# Patient Record
Sex: MALE | Race: OTHER | NOT HISPANIC OR LATINO | URBAN - METROPOLITAN AREA
[De-identification: names, ages, dates, MRNs, and addresses within clinical notes are randomized per-mention and may not be internally consistent; named-entity substitution may affect disease eponyms.]

---

## 2019-09-25 ENCOUNTER — INPATIENT (INPATIENT)
Facility: HOSPITAL | Age: 59
LOS: 1 days | Discharge: HOME | DRG: 812 | End: 2019-09-27
Attending: STUDENT IN AN ORGANIZED HEALTH CARE EDUCATION/TRAINING PROGRAM | Admitting: STUDENT IN AN ORGANIZED HEALTH CARE EDUCATION/TRAINING PROGRAM
Payer: MEDICAID

## 2019-09-25 VITALS
OXYGEN SATURATION: 100 % | TEMPERATURE: 99 F | SYSTOLIC BLOOD PRESSURE: 156 MMHG | HEART RATE: 68 BPM | DIASTOLIC BLOOD PRESSURE: 93 MMHG | RESPIRATION RATE: 19 BRPM | WEIGHT: 203.05 LBS | HEIGHT: 70 IN

## 2019-09-25 DIAGNOSIS — D64.9 ANEMIA, UNSPECIFIED: ICD-10-CM

## 2019-09-25 LAB
ALBUMIN SERPL ELPH-MCNC: 4.2 G/DL — SIGNIFICANT CHANGE UP (ref 3.5–5)
ALP SERPL-CCNC: 71 U/L — SIGNIFICANT CHANGE UP (ref 40–120)
ALT FLD-CCNC: 26 U/L DA — SIGNIFICANT CHANGE UP (ref 10–60)
ANION GAP SERPL CALC-SCNC: 6 MMOL/L — SIGNIFICANT CHANGE UP (ref 5–17)
APTT BLD: 30 SEC — SIGNIFICANT CHANGE UP (ref 27.5–36.3)
AST SERPL-CCNC: 14 U/L — SIGNIFICANT CHANGE UP (ref 10–40)
BASOPHILS # BLD AUTO: 0.04 K/UL — SIGNIFICANT CHANGE UP (ref 0–0.2)
BASOPHILS NFR BLD AUTO: 0.6 % — SIGNIFICANT CHANGE UP (ref 0–2)
BILIRUB SERPL-MCNC: 0.4 MG/DL — SIGNIFICANT CHANGE UP (ref 0.2–1.2)
BUN SERPL-MCNC: 18 MG/DL — SIGNIFICANT CHANGE UP (ref 7–18)
CALCIUM SERPL-MCNC: 9.3 MG/DL — SIGNIFICANT CHANGE UP (ref 8.4–10.5)
CHLORIDE SERPL-SCNC: 106 MMOL/L — SIGNIFICANT CHANGE UP (ref 96–108)
CO2 SERPL-SCNC: 28 MMOL/L — SIGNIFICANT CHANGE UP (ref 22–31)
CREAT SERPL-MCNC: 1.21 MG/DL — SIGNIFICANT CHANGE UP (ref 0.5–1.3)
EOSINOPHIL # BLD AUTO: 0.23 K/UL — SIGNIFICANT CHANGE UP (ref 0–0.5)
EOSINOPHIL NFR BLD AUTO: 3.7 % — SIGNIFICANT CHANGE UP (ref 0–6)
GLUCOSE SERPL-MCNC: 100 MG/DL — HIGH (ref 70–99)
HCT VFR BLD CALC: 32.5 % — LOW (ref 39–50)
HGB BLD-MCNC: 8.9 G/DL — LOW (ref 13–17)
IMM GRANULOCYTES NFR BLD AUTO: 0.3 % — SIGNIFICANT CHANGE UP (ref 0–1.5)
INR BLD: 1.15 RATIO — SIGNIFICANT CHANGE UP (ref 0.88–1.16)
LYMPHOCYTES # BLD AUTO: 2.65 K/UL — SIGNIFICANT CHANGE UP (ref 1–3.3)
LYMPHOCYTES # BLD AUTO: 42.1 % — SIGNIFICANT CHANGE UP (ref 13–44)
MCHC RBC-ENTMCNC: 17.3 PG — LOW (ref 27–34)
MCHC RBC-ENTMCNC: 27.4 GM/DL — LOW (ref 32–36)
MCV RBC AUTO: 63.2 FL — LOW (ref 80–100)
MONOCYTES # BLD AUTO: 0.47 K/UL — SIGNIFICANT CHANGE UP (ref 0–0.9)
MONOCYTES NFR BLD AUTO: 7.5 % — SIGNIFICANT CHANGE UP (ref 2–14)
NEUTROPHILS # BLD AUTO: 2.89 K/UL — SIGNIFICANT CHANGE UP (ref 1.8–7.4)
NEUTROPHILS NFR BLD AUTO: 45.8 % — SIGNIFICANT CHANGE UP (ref 43–77)
NRBC # BLD: 0 /100 WBCS — SIGNIFICANT CHANGE UP (ref 0–0)
OB PNL STL: NEGATIVE — SIGNIFICANT CHANGE UP
PLATELET # BLD AUTO: 359 K/UL — SIGNIFICANT CHANGE UP (ref 150–400)
POTASSIUM SERPL-MCNC: 4.4 MMOL/L — SIGNIFICANT CHANGE UP (ref 3.5–5.3)
POTASSIUM SERPL-SCNC: 4.4 MMOL/L — SIGNIFICANT CHANGE UP (ref 3.5–5.3)
PROT SERPL-MCNC: 7.9 G/DL — SIGNIFICANT CHANGE UP (ref 6–8.3)
PROTHROM AB SERPL-ACNC: 12.8 SEC — SIGNIFICANT CHANGE UP (ref 10–12.9)
RBC # BLD: 5.14 M/UL — SIGNIFICANT CHANGE UP (ref 4.2–5.8)
RBC # FLD: 21 % — HIGH (ref 10.3–14.5)
SODIUM SERPL-SCNC: 140 MMOL/L — SIGNIFICANT CHANGE UP (ref 135–145)
WBC # BLD: 6.3 K/UL — SIGNIFICANT CHANGE UP (ref 3.8–10.5)
WBC # FLD AUTO: 6.3 K/UL — SIGNIFICANT CHANGE UP (ref 3.8–10.5)

## 2019-09-25 PROCEDURE — 99223 1ST HOSP IP/OBS HIGH 75: CPT

## 2019-09-25 PROCEDURE — 99285 EMERGENCY DEPT VISIT HI MDM: CPT

## 2019-09-25 RX ORDER — VALSARTAN 80 MG/1
0 TABLET ORAL
Qty: 0 | Refills: 0 | DISCHARGE

## 2019-09-25 RX ORDER — CHOLECALCIFEROL (VITAMIN D3) 125 MCG
0 CAPSULE ORAL
Qty: 0 | Refills: 0 | DISCHARGE

## 2019-09-25 RX ORDER — OMEPRAZOLE 10 MG/1
0 CAPSULE, DELAYED RELEASE ORAL
Qty: 0 | Refills: 0 | DISCHARGE

## 2019-09-25 RX ORDER — OMEPRAZOLE 10 MG/1
1 CAPSULE, DELAYED RELEASE ORAL
Qty: 0 | Refills: 0 | DISCHARGE

## 2019-09-25 RX ORDER — VALSARTAN 80 MG/1
1 TABLET ORAL
Qty: 0 | Refills: 0 | DISCHARGE

## 2019-09-25 RX ORDER — APIXABAN 2.5 MG/1
0 TABLET, FILM COATED ORAL
Qty: 0 | Refills: 0 | DISCHARGE

## 2019-09-25 NOTE — H&P ADULT - ATTENDING COMMENTS
Vital Signs Last 24 Hrs  T(C): 37 (25 Sep 2019 19:23), Max: 37 (25 Sep 2019 19:23)  T(F): 98.6 (25 Sep 2019 19:23), Max: 98.6 (25 Sep 2019 19:23)  HR: 68 (25 Sep 2019 19:23) (68 - 68)  BP: 156/93 (25 Sep 2019 19:23) (156/93 - 156/93)  BP(mean): --  RR: 19 (25 Sep 2019 19:23) (19 - 19)  SpO2: 100% (25 Sep 2019 19:23) (100% - 100%) Patient seen and examined ; case was discussed with the admitting resident    ROS: as in the HPI; all other ROS negative    SH and family history as above    Vital Signs Last 24 Hrs  T(C): 36.4 (26 Sep 2019 01:05), Max: 37 (25 Sep 2019 19:23)  T(F): 97.5 (26 Sep 2019 01:05), Max: 98.6 (25 Sep 2019 19:23)  HR: 67 (26 Sep 2019 01:05) (67 - 68)  BP: 148/90 (26 Sep 2019 01:05) (148/90 - 177/95)  BP(mean): --  RR: 18 (26 Sep 2019 01:05) (18 - 19)  SpO2: 100% (26 Sep 2019 01:05) (100% - 100%)    GEN: NAD, pale   HEENT- normocephalic; mouth moist  CVS- S1S2+  LUNGS- clear to auscultation; no wheezing  ABD: Soft , nontender, nondistended, Bowel sounds are present  EXTREMITY: no calf tenderness, no cyanosis, no edema  NEURO: AAOx3; non focal neurologic exam; cranial nerves grossly intact  PSYCH: normal affect and behavior  BACK: no swelling or mass;   VASCULAR: ++ distal peripheral pulses  SKIN: warm and dry.       Labs Reviewed:                         8.9    6.30  )-----------( 359      ( 25 Sep 2019 20:21 )             32.5     09-25    140  |  106  |  18  ----------------------------<  100<H>  4.4   |  28  |  1.21    Ca    9.3      25 Sep 2019 20:21    TPro  7.9  /  Alb  4.2  /  TBili  0.4  /  DBili  x   /  AST  14  /  ALT  26  /  AlkPhos  71  09-25          PT/INR - ( 25 Sep 2019 20:21 )   PT: 12.8 sec;   INR: 1.15 ratio         PTT - ( 25 Sep 2019 20:21 )  PTT:30.0 sec  BNP:   MEDICATIONS  (STANDING):  influenza   Vaccine 0.5 milliLiter(s) IntraMuscular once  losartan 50 milliGRAM(s) Oral daily  pantoprazole    Tablet 40 milliGRAM(s) Oral before breakfast    59 y/o M with h/o unprovoked DVT admitted with anemia found on labwork. Patient denies overt bleeding but had some dark stool several days PTA. Reports fatigue, denies syncope. No previous GI workup. PCP records not available but prev Hgb reported to ED attending as 11 in February.     1. Symptomatic anemia, microcytic- check iron panel and eval for hemolysis, hgb electrophoresis, check CT abdomen, orthostatics, hold Eliquis for now pending repeat CBC in AM and abd imaging. No recent fills via pharmacy, would obtain medical records from PMD. Appreciate GI consultation. No indications to transfuse at this time, might need iv iron.   2. H/o LE DVT- hodlding eliquis tonight as above, obtain previous records, can continue in AM if no clinical evidence of bleeding and h/h stable.   3. HTN     Plan of care discussed with patient ;  all questions and concerns were addressed.

## 2019-09-25 NOTE — H&P ADULT - PROBLEM SELECTOR PLAN 1
P/w hgb of 8.9, 1 episode of melena 4 days ago, PETERSON  On eliquis for dvt  -stool guaiac negative, would repeat daily for 2 more days  -f/u anemia panel  -GI consult, may need endoscopy/colonoscopy  -Monitor H/H P/w hgb of 8.9, 1 episode of melena 4 days ago, PETERSON  On eliquis for dvt  -stool guaiac negative, would repeat daily for 2 more days  -holding eliquis as acuity unknown, resume if h/h stable  -f/u ct abd/pelvis w/ contrast  -f/u anemia panel  -GI consult, may need endoscopy/colonoscopy  -Monitor H/H P/w hgb of 8.9, 1 episode of melena 4 days ago, PETERSON  On eliquis for dvt  -stool guaiac negative, would repeat daily for 2 more days  -holding eliquis as acuity unknown, resume if h/h stable  -f/u ct abd/pelvis w/ contrast  -f/u anemia panel  -GI consult Dr Lynne, may need endoscopy/colonoscopy  -Monitor H/H

## 2019-09-25 NOTE — H&P ADULT - HISTORY OF PRESENT ILLNESS
Patient is a 59yo male with HTN, DVT (on eliquis), sent from PMD's office for hemoglobin of 8.8 on lab work. Patient states he went in for routine labs and was found to have low hemoglobin. Denies chest pain, weakness, swelling of legs, or lightheadedness but reports PETERSON. He reports being diagnosed with DVT of LLE in march 2019 with no known provoking factor, for which he was started on eliquis, had a repeat sono in 3 weeks which showed resolution of clot, and was advised lifelong anticoagulation. He denies abdominal pain, nausea, bloo din stools or urine, reports an episode of dark stool 4 days prior. Only reports occasional cramps in both lower extremities, ROS otherwise negative. Patient has never had endoscopy or colonoscopy.

## 2019-09-25 NOTE — H&P ADULT - ASSESSMENT
Patient is a 59yo male with HTN, DVT (on eliquis), sent from PMD's office for hemoglobin of 8.8 on lab work.   In ER, his vitals were stable, labs noted with hgb 8.9, stool guaiac negative.

## 2019-09-25 NOTE — ED PROVIDER NOTE - OBJECTIVE STATEMENT
57 y/o M presents to ED complaining of anemia. Pt states he was sent in by his PMD and his hemoglobin was 8.8 yesterday and was 11 in february. Pt states he noticed black stool 4d ago. Pt adds he is on Eliquis for DVT. Pt adds he feels tired. Patient denies headache, chest pain, abdominal pain, nausea, vomiting, diarrhea, blood in stool, or any other complaints. NKDA.

## 2019-09-25 NOTE — ED PROVIDER NOTE - NS ED MD EM SELECTION
Primary Physician:  Dr. Michele    Problem List:  1.  CAD   --s/p 2 vessel CABG 2/4/2004  --BLACK-->LAD, SVG-->Acute Marginal of RCA  --8/25/2009: PTCA/stent of the OM1 in 2 locations each with a 3 x 12 BMS  2.  Thrombocytopenia: s/p splenectomy  3.  HTN  4.  Hyperlipidemia  5.  DM2      HPI:  Pt returns for follow up visit.  He denies chest pain.  He has shortness of at times with exertion.  He states when using the push  or walking a flight of stairs he will develop shortness of breath.  He rests for 5-10 minutes and resumes his activity.  He denies dizziness, lightheadedness, or syncope.  He denies paroxysmal nocturnal dyspnea or orthopnea.  He denies rapid weight gain or edema.      ALLERGIES:   Allergen Reactions   • Morphine DIZZINESS and NAUSEA       MEDICATIONS:    Current Outpatient Prescriptions   Medication Sig   • lisinopril (ZESTRIL) 10 MG tablet TAKE ONE TABLET BY MOUTH ONCE DAILY   • metoPROLOL tartrate (LOPRESSOR) 50 MG tablet TAKE ONE TABLET BY MOUTH TWICE DAILY   • glimepiride (AMARYL) 2 MG tablet TAKE TWO TABLETS BY MOUTH ONCE DAILY BEFORE BREAKFAST   • donepezil (ARICEPT) 10 MG tablet Take 1 tablet by mouth nightly.   • pravastatin (PRAVACHOL) 80 MG tablet Take 1 tablet by mouth daily.   • metformin (GLUCOPHAGE-XR) 750 MG 24 hr tablet Take 3 tablets by mouth daily (with breakfast).   • hydrochlorothiazide (HYDRODIURIL) 25 MG tablet TAKE ONE-HALF TABLET BY MOUTH IN THE MORNING   • Multiple Vitamins-Minerals (MULTIVITAL PO) Take  by mouth.   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 Lezama--LIMA->LAD; SVG Ao->RCA   • Colonoscopy remove lesion hot bx or cautery  4/3/2007    DR Linder colonic mucosa with hyperplastic changes and focal thermal ariface   • Destruc retinal lesn,cryotherapy  7/01    symptomatic retinal holes, OD.  Dr. Casas   • Flexible sigmoidoscopy dx  4/00    Routine screening.  Normal sig to 70 cm.  Dr. Michele   • Left heart cath,percutaneous  2/3/04    Cardiac Cath   • Left heart cath,percutaneous  08/25/2009    Dr. Clay @ [facility]--est EF 55%; significant 2-vessel CAD (LAD, Cx); bypass grafts patent and/or diseased   • Myocardl perf rest stress  1/04   • Removal of sperm duct(s)      Vasectomy   • Removal spleen, total  9/98    thrombocytopenia, Dr. Washburn   • Remove tonsils/adenoids,<11 y/o      T & A   • Remv cataract extracap insert lens  12/16/04    Phaco with iol od GPS SA60AT 14.50    • Remv cataract extracap insert lens  09.18.2006    Phaco w/ IOL od SA60AT 13.50 - gps   • Repair ing hernia,3+y/o,reducibl      Hernia, inguinal   • Retinal detachment repair w/ scleral buckle le  2011    left - Augusto   • Sleep study attended  2/96    severe obstructive apnea w severe hypoxemia, severe snoring.  Dr. Finley   • Total knee replacement  7/12/05    bilateral total knee Dr CHAPMAN left, Dr SARMIENTO right   • Transcath stent each addn vessl,perc  08/25/2009    3.0 x 12 Vision RX BMS and 3.0 x 12 Vision RX BMS--->OM1         Social History   • Marital Status:      Number of Children: 1   Occupational History   •  [employer]   Social History Main Topics   • Tobacco Use: Never      Rare pipe use >40 years ago   • Alcohol Use: Yes      rare   • Drug Use: Not on file         Family Status   Relation Status   • Fa   at age 81        DM, myeloma, melanoma   • Mo  at age 47        MVA   • Bro  at age 60's         accident   • Bro Alive        high cholesterol, HTN,    • Bro Alive        high cholesterol heart issues   • Sis  at age 68         cancer lungs mets   • Remy Alive        well   • Bro Alive        1/2 brother   • Bro (Not Specified)         ROS:  Eye Problem(s):negative  ENT Problem(s):negative  Cardiovascular problem(s):negative  Respiratory problem(s):negative respiratory  Gastro-intestinal problem(s):negative GI  Genito-urinary problem(s):negative  Musculoskeletal problem(s):negative  Integumentary problem(s):negative  Neurological problem(s):negative  Psychiatric problem(s):negative  Endocrine problem(s):negative  Hematologic and/or Lymphatic problem(s):negative      PHYSICAL EXAMINATION:  CONSTITUTIONAL:   Vitals:    18 1522 18 1526   BP: 118/62 116/60   Pulse: 64      GENERAL:  No apparent distress.  EYES:  There is no conjunctival injection, lesions of the eyelids, or arcus senilis.  ENT: There is no oral pallor or cyanosis.  RESP: There is a normal respiratory effort with clear lungs to auscultation and percussion bilaterally.  CARDIOVASC:  The PMI is not palpable.  The precordium is normoactive.  There is a regular rate and rhythm with a normal S1 and S2.  There is a 2/6 systolic ejection murmur at the right upper sternal border with radiation to the sternal notch that peaks in late systole.  There are no rubs or gallops audible.  The carotid arteries are 2+ bilaterally with no bruits.  There is no jugular venous distention or hepatojugular reflux.  The abdominal aorta demonstrates no bruits.  There are 2+ popliteal pulses bilaterally.  The pedal pulses are 1+ bilaterally at the posterior tibial and dorsalis pedis areas.  GI: Non tender abdomen with normoactive bowel sounds and no hepatosplenomegaly.  There are no masses palpable.  MUSCULOSKELETAL: The patient has a  normal gait capable of exercise treadmill testing.  EXTREMITIES: There is no clubbing or cyanosis of the digits or nails.  There is no lower extremity edema.  SKIN: Warm, dry, and intact.  NEURO: The patient is alert and oriented to person, place, and time and has a normal mood and affect.      LABS:  Component      Latest Ref Rng & Units 4/24/2018   FASTING STATUS      hrs 12   CHOLESTEROL      <200 mg/dL 124   CALCULATED LDL      <130 mg/dL 73   HDL      >39 mg/dL 36 (L)   TRIGLYCERIDE      <150 mg/dL 74   CALCULATED NON HDL      mg/dL 88   CHOL/HDL      <4.5 3.4   LDL (Direct)      <130 mg/dL 83   GLYCOHEMOGLOBIN A1C      4.5 - 5.6 % 6.6 (H)         ASSESSMENT/PLAN:  1.  CAD/CABG on 2/4/2004; s/p PCI of the OM1 8/2009 with 2 bare metal stents: He now reports some limiting shortness of breath.  --continue current medications  --perfusion stress test to assess for ischemic cause of dyspnea    2.  HTN--controlled.  --continue current medications    3.  Hyperlipidemia--controlled.  --lipids due next year    4.  Aortic stenosis murmur--murmur now peaks in late systole.  Patient with shortness of breath at this time.  --2D echo doppler    Dispo: Follow up in 1 year.   [id number] [facility]

## 2019-09-25 NOTE — ED PROVIDER NOTE - CLINICAL SUMMARY MEDICAL DECISION MAKING FREE TEXT BOX
57 y/o M presents to ED complaining of anemia. Will check labs, stool for occult blood and will reassess.

## 2019-09-25 NOTE — H&P ADULT - NSHPPHYSICALEXAM_GEN_ALL_CORE
Vital Signs Last 24 Hrs  T(C): 36.8 (25 Sep 2019 23:29), Max: 37 (25 Sep 2019 19:23)  T(F): 98.3 (25 Sep 2019 23:29), Max: 98.6 (25 Sep 2019 19:23)  HR: 67 (25 Sep 2019 23:29) (67 - 68)  BP: 177/95 (25 Sep 2019 23:29) (156/93 - 177/95)  RR: 18 (25 Sep 2019 23:29) (18 - 19)  SpO2: 100% (25 Sep 2019 23:29) (100% - 100%)    PHYSICAL EXAM:  GENERAL: NAD, well-developed  HEAD:  Atraumatic, Normocephalic  EYES: EOMI, PERRLA, conjunctiva and sclera clear  NECK: Supple, No JVD  CHEST/LUNG: Clear to auscultation bilaterally; No wheeze  HEART: Regular rate and rhythm; No murmurs, rubs, or gallops  ABDOMEN: Soft, Nontender, Nondistended; Bowel sounds present  EXTREMITIES:, No clubbing, cyanosis, or edema  PSYCH: AAOx3  NEUROLOGY: non-focal  SKIN: No rashes or lesions

## 2019-09-25 NOTE — ED PROVIDER NOTE - CHPI ED SYMPTOMS NEG
no nausea/no vomiting/no diarrhea, no headache, no chest pain, no shortness of breath, no abdominal pain, no blood in stool

## 2019-09-25 NOTE — H&P ADULT - PROBLEM SELECTOR PLAN 2
History of DVT, on eliquis  Will continue for now,   Would recommend following up with PCP for etiology as patient unsure History of DVT, on eliquis  Will hold for now,  -f/u repeat H/H in AM to confirm stable level and then resume  -f/u doppler LE  Would recommend following up with PCP for etiology as patient unsure History of DVT, on eliquis  Will hold for now,  -f/u repeat H/H in AM to confirm stable level and then resume  -f/u doppler LE  Would recommend following up with PCP for etiology as patient unable to provide details

## 2019-09-26 DIAGNOSIS — D64.9 ANEMIA, UNSPECIFIED: ICD-10-CM

## 2019-09-26 DIAGNOSIS — Z29.9 ENCOUNTER FOR PROPHYLACTIC MEASURES, UNSPECIFIED: ICD-10-CM

## 2019-09-26 DIAGNOSIS — I82.409 ACUTE EMBOLISM AND THROMBOSIS OF UNSPECIFIED DEEP VEINS OF UNSPECIFIED LOWER EXTREMITY: ICD-10-CM

## 2019-09-26 DIAGNOSIS — R09.89 OTHER SPECIFIED SYMPTOMS AND SIGNS INVOLVING THE CIRCULATORY AND RESPIRATORY SYSTEMS: ICD-10-CM

## 2019-09-26 DIAGNOSIS — I10 ESSENTIAL (PRIMARY) HYPERTENSION: ICD-10-CM

## 2019-09-26 LAB
% ALBUMIN: 61.3 % — SIGNIFICANT CHANGE UP
% ALPHA 1: 3.8 % — SIGNIFICANT CHANGE UP
% ALPHA 2: 8 % — SIGNIFICANT CHANGE UP
% BETA: 12.1 % — SIGNIFICANT CHANGE UP
% GAMMA: 14.8 % — SIGNIFICANT CHANGE UP
24R-OH-CALCIDIOL SERPL-MCNC: 31.3 NG/ML — SIGNIFICANT CHANGE UP (ref 30–80)
ALBUMIN SERPL ELPH-MCNC: 4 G/DL — SIGNIFICANT CHANGE UP (ref 3.6–5.5)
ALBUMIN/GLOB SERPL ELPH: 1.6 RATIO — SIGNIFICANT CHANGE UP
ALPHA1 GLOB SERPL ELPH-MCNC: 0.2 G/DL — SIGNIFICANT CHANGE UP (ref 0.1–0.4)
ALPHA2 GLOB SERPL ELPH-MCNC: 0.5 G/DL — SIGNIFICANT CHANGE UP (ref 0.5–1)
ANION GAP SERPL CALC-SCNC: 7 MMOL/L — SIGNIFICANT CHANGE UP (ref 5–17)
B-GLOBULIN SERPL ELPH-MCNC: 0.8 G/DL — SIGNIFICANT CHANGE UP (ref 0.5–1)
BUN SERPL-MCNC: 17 MG/DL — SIGNIFICANT CHANGE UP (ref 7–18)
CALCIUM SERPL-MCNC: 8.9 MG/DL — SIGNIFICANT CHANGE UP (ref 8.4–10.5)
CHLORIDE SERPL-SCNC: 105 MMOL/L — SIGNIFICANT CHANGE UP (ref 96–108)
CHOLEST SERPL-MCNC: 151 MG/DL — SIGNIFICANT CHANGE UP (ref 10–199)
CO2 SERPL-SCNC: 27 MMOL/L — SIGNIFICANT CHANGE UP (ref 22–31)
CREAT SERPL-MCNC: 1.16 MG/DL — SIGNIFICANT CHANGE UP (ref 0.5–1.3)
FERRITIN SERPL-MCNC: 6 NG/ML — LOW (ref 30–400)
FOLATE SERPL-MCNC: 7.5 NG/ML — SIGNIFICANT CHANGE UP
GAMMA GLOBULIN: 1 G/DL — SIGNIFICANT CHANGE UP (ref 0.6–1.6)
GLUCOSE SERPL-MCNC: 91 MG/DL — SIGNIFICANT CHANGE UP (ref 70–99)
HAPTOGLOB SERPL-MCNC: 152 MG/DL — SIGNIFICANT CHANGE UP (ref 34–200)
HBA1C BLD-MCNC: 5.4 % — SIGNIFICANT CHANGE UP (ref 4–5.6)
HCT VFR BLD CALC: 29.9 % — LOW (ref 39–50)
HCV AB S/CO SERPL IA: 0.15 S/CO — SIGNIFICANT CHANGE UP (ref 0–0.99)
HCV AB SERPL-IMP: SIGNIFICANT CHANGE UP
HDLC SERPL-MCNC: 67 MG/DL — SIGNIFICANT CHANGE UP
HGB BLD-MCNC: 8.3 G/DL — LOW (ref 13–17)
IRON SATN MFR SERPL: 21 UG/DL — LOW (ref 65–170)
IRON SATN MFR SERPL: 5 % — LOW (ref 20–55)
LDH SERPL L TO P-CCNC: 132 U/L — SIGNIFICANT CHANGE UP (ref 120–225)
LIPID PNL WITH DIRECT LDL SERPL: 66 MG/DL — SIGNIFICANT CHANGE UP
MCHC RBC-ENTMCNC: 17.3 PG — LOW (ref 27–34)
MCHC RBC-ENTMCNC: 27.8 GM/DL — LOW (ref 32–36)
MCV RBC AUTO: 62.2 FL — LOW (ref 80–100)
NRBC # BLD: 0 /100 WBCS — SIGNIFICANT CHANGE UP (ref 0–0)
PLATELET # BLD AUTO: 297 K/UL — SIGNIFICANT CHANGE UP (ref 150–400)
POTASSIUM SERPL-MCNC: 3.7 MMOL/L — SIGNIFICANT CHANGE UP (ref 3.5–5.3)
POTASSIUM SERPL-SCNC: 3.7 MMOL/L — SIGNIFICANT CHANGE UP (ref 3.5–5.3)
PROT PATTERN SERPL ELPH-IMP: SIGNIFICANT CHANGE UP
PROT SERPL-MCNC: 6.5 G/DL — SIGNIFICANT CHANGE UP (ref 6–8.3)
PROT SERPL-MCNC: 6.5 G/DL — SIGNIFICANT CHANGE UP (ref 6–8.3)
RBC # BLD: 4.81 M/UL — SIGNIFICANT CHANGE UP (ref 4.2–5.8)
RBC # BLD: 4.81 M/UL — SIGNIFICANT CHANGE UP (ref 4.2–5.8)
RBC # FLD: 20.8 % — HIGH (ref 10.3–14.5)
RETICS #: 52.7 K/UL — SIGNIFICANT CHANGE UP (ref 25–125)
RETICS/RBC NFR: 1.1 % — SIGNIFICANT CHANGE UP (ref 0.5–2.5)
SODIUM SERPL-SCNC: 139 MMOL/L — SIGNIFICANT CHANGE UP (ref 135–145)
TIBC SERPL-MCNC: 432 UG/DL — SIGNIFICANT CHANGE UP (ref 250–450)
TOTAL CHOLESTEROL/HDL RATIO MEASUREMENT: 2.3 RATIO — LOW (ref 3.4–9.6)
TRANSFERRIN SERPL-MCNC: 340 MG/DL — SIGNIFICANT CHANGE UP (ref 200–360)
TRIGL SERPL-MCNC: 88 MG/DL — SIGNIFICANT CHANGE UP (ref 10–149)
TSH SERPL-MCNC: 3.23 UU/ML — SIGNIFICANT CHANGE UP (ref 0.34–4.82)
UIBC SERPL-MCNC: 411 UG/DL — HIGH (ref 110–370)
VIT B12 SERPL-MCNC: 434 PG/ML — SIGNIFICANT CHANGE UP (ref 232–1245)
WBC # BLD: 4.6 K/UL — SIGNIFICANT CHANGE UP (ref 3.8–10.5)
WBC # FLD AUTO: 4.6 K/UL — SIGNIFICANT CHANGE UP (ref 3.8–10.5)

## 2019-09-26 PROCEDURE — 99233 SBSQ HOSP IP/OBS HIGH 50: CPT | Mod: GC

## 2019-09-26 PROCEDURE — 93970 EXTREMITY STUDY: CPT | Mod: 26

## 2019-09-26 PROCEDURE — 74177 CT ABD & PELVIS W/CONTRAST: CPT | Mod: 26

## 2019-09-26 RX ORDER — PANTOPRAZOLE SODIUM 20 MG/1
40 TABLET, DELAYED RELEASE ORAL
Refills: 0 | Status: DISCONTINUED | OUTPATIENT
Start: 2019-09-25 | End: 2019-09-27

## 2019-09-26 RX ORDER — APIXABAN 2.5 MG/1
5 TABLET, FILM COATED ORAL EVERY 12 HOURS
Refills: 0 | Status: DISCONTINUED | OUTPATIENT
Start: 2019-09-25 | End: 2019-09-26

## 2019-09-26 RX ORDER — APIXABAN 2.5 MG/1
5 TABLET, FILM COATED ORAL EVERY 12 HOURS
Refills: 0 | Status: DISCONTINUED | OUTPATIENT
Start: 2019-09-26 | End: 2019-09-26

## 2019-09-26 RX ORDER — INFLUENZA VIRUS VACCINE 15; 15; 15; 15 UG/.5ML; UG/.5ML; UG/.5ML; UG/.5ML
0.5 SUSPENSION INTRAMUSCULAR ONCE
Refills: 0 | Status: DISCONTINUED | OUTPATIENT
Start: 2019-09-26 | End: 2019-09-27

## 2019-09-26 RX ORDER — IRON SUCROSE 20 MG/ML
200 INJECTION, SOLUTION INTRAVENOUS EVERY 24 HOURS
Refills: 0 | Status: DISCONTINUED | OUTPATIENT
Start: 2019-09-26 | End: 2019-09-27

## 2019-09-26 RX ORDER — LOSARTAN POTASSIUM 100 MG/1
50 TABLET, FILM COATED ORAL DAILY
Refills: 0 | Status: DISCONTINUED | OUTPATIENT
Start: 2019-09-26 | End: 2019-09-27

## 2019-09-26 RX ORDER — APIXABAN 2.5 MG/1
1 TABLET, FILM COATED ORAL
Qty: 0 | Refills: 0 | DISCHARGE

## 2019-09-26 RX ADMIN — LOSARTAN POTASSIUM 50 MILLIGRAM(S): 100 TABLET, FILM COATED ORAL at 05:51

## 2019-09-26 RX ADMIN — APIXABAN 5 MILLIGRAM(S): 2.5 TABLET, FILM COATED ORAL at 12:06

## 2019-09-26 RX ADMIN — IRON SUCROSE 110 MILLIGRAM(S): 20 INJECTION, SOLUTION INTRAVENOUS at 12:06

## 2019-09-26 RX ADMIN — PANTOPRAZOLE SODIUM 40 MILLIGRAM(S): 20 TABLET, DELAYED RELEASE ORAL at 06:07

## 2019-09-26 NOTE — PROGRESS NOTE ADULT - SUBJECTIVE AND OBJECTIVE BOX
HPI: 59yo male with hx of HTN, DVT (on eliquis), sent from PMD's office for hemoglobin of 8.8 on lab work. admitted for anemia work up, GI consulted.   OVERNIGHT EVENTS: no acute events overnight, denies noticing BRBPR     REVIEW OF SYSTEMS:  CONSTITUTIONAL: No fever, chills  NECK: No pain or stiffness  RESPIRATORY: No cough, SOB  CARDIOVASCULAR: No chest pain, palpitations  GASTROINTESTINAL: No abdominal pain. No nausea, vomiting, or diarrhea  GENITOURINARY: No dysuria  NEUROLOGICAL: No HA  MUSCULOSKELETAL: No joint pain or swelling; No muscle, back, or extremity pain    T(C): 36.4 (09-26-19 @ 08:01), Max: 37 (09-25-19 @ 19:23)  HR: 63 (09-26-19 @ 08:01) (63 - 69)  BP: 141/96 (09-26-19 @ 08:01) (139/86 - 177/95)  RR: 16 (09-26-19 @ 08:01) (16 - 19)  SpO2: 100% (09-26-19 @ 08:01) (100% - 100%)  Wt(kg): --Vital Signs Last 24 Hrs  T(C): 36.4 (26 Sep 2019 08:01), Max: 37 (25 Sep 2019 19:23)  T(F): 97.5 (26 Sep 2019 08:01), Max: 98.6 (25 Sep 2019 19:23)  HR: 63 (26 Sep 2019 08:01) (63 - 69)  BP: 141/96 (26 Sep 2019 08:01) (139/86 - 177/95)  BP(mean): --  RR: 16 (26 Sep 2019 08:01) (16 - 19)  SpO2: 100% (26 Sep 2019 08:01) (100% - 100%)    MEDICATIONS  (STANDING):  apixaban 5 milliGRAM(s) Oral every 12 hours  influenza   Vaccine 0.5 milliLiter(s) IntraMuscular once  iron sucrose IVPB 200 milliGRAM(s) IV Intermittent every 24 hours  losartan 50 milliGRAM(s) Oral daily  pantoprazole    Tablet 40 milliGRAM(s) Oral before breakfast    MEDICATIONS  (PRN):      PHYSICAL EXAM:  GENERAL: NAD  ENMT: Moist mucous membranes  NECK: Supple, No JVD  CHEST/LUNG: Clear to auscultation bilaterally; No rales, rhonchi, wheezing, or rubs  HEART: S1, S2, Regular rate and rhythm  ABDOMEN: Soft, Nontender, Nondistended; Bowel sounds present  NEURO: Alert & Oriented X3  EXTREMITIES: No LE edema, no calf tenderness    Consultant(s) Notes Reviewed:  [x ] YES  [ ] NO  Care Discussed with Consultants/Other Providers [ x] YES  [ ] NO    LABS:                        8.3    4.60  )-----------( 297      ( 26 Sep 2019 06:38 )             29.9     09-26    139  |  105  |  17  ----------------------------<  91  3.7   |  27  |  1.16    Ca    8.9      26 Sep 2019 06:38    TPro  6.5  /  Alb  x   /  TBili  x   /  DBili  x   /  AST  x   /  ALT  x   /  AlkPhos  x   09-26    PT/INR - ( 25 Sep 2019 20:21 )   PT: 12.8 sec;   INR: 1.15 ratio         PTT - ( 25 Sep 2019 20:21 )  PTT:30.0 sec    CAPILLARY BLOOD GLUCOSE    RADIOLOGY & ADDITIONAL TESTS:        Imaging Personally Reviewed:  [ ] YES  [ ] NO

## 2019-09-26 NOTE — DISCHARGE NOTE PROVIDER - HOSPITAL COURSE
HPI: Patient is a 59yo male with HTN, DVT (on eliquis), sent from PMD's office for hemoglobin of 8.8 on lab work. Patient states he went in for routine labs and was found to have low hemoglobin. Denies chest pain, weakness, swelling of legs, or lightheadedness but reports PETERSON. He reports being diagnosed with DVT of LLE in march 2019 with no known provoking factor, for which he was started on eliquis, had a repeat sono in 3 weeks which showed resolution of clot, and was advised lifelong anticoagulation. He denies abdominal pain, nausea, bloo din stools or urine, reports an episode of dark stool 4 days prior. Only reports occasional cramps in both lower extremities, ROS otherwise negative. Patient has never had endoscopy or colonoscopy. (25 Sep 2019 23:25)    admitted for anemia work up  anemia multifactorial JENNIFER, ? low baseline (unknown) vs malignancy. On Eliquis for DVT at home which is on hold currently for possible scope. repeat LE US was done and it was negative for DVT. occult negative. H/H remained  stable.  started on IV Venofer. ct abd/pelvis w/ contrast was done and it showed Hiatal hernia. Colonic narrowings raising concern for malignancy, unable to scope pt in the hospital tomorrow due to schedule. Pt has an apt with GI Dr. Quach on 9/27 at 2PM. pt cleared for discharge with out pt follow up with GI and PCP HPI: Patient is a 59yo male with HTN, DVT (on eliquis), sent from PMD's office for hemoglobin of 8.8 on lab work. Patient states he went in for routine labs and was found to have low hemoglobin. Denies chest pain, weakness, swelling of legs, or lightheadedness but reports PETERSON. He reports being diagnosed with DVT of LLE in march 2019 with no known provoking factor, for which he was started on eliquis, had a repeat sono in 3 weeks which showed resolution of clot, and was advised lifelong anticoagulation. He denies abdominal pain, nausea, bloo din stools or urine, reports an episode of dark stool 4 days prior. Only reports occasional cramps in both lower extremities, ROS otherwise negative. Patient has never had endoscopy or colonoscopy. (25 Sep 2019 23:25)    admitted for anemia work up  anemia multifactorial JENNIFER, ? low baseline (unknown) vs malignancy. On Eliquis for DVT at home which is on hold currently for possible scope. repeat LE US was done and it was negative for DVT. occult negative. H/H remained  stable.  started on IV Venofer. ct abd/pelvis w/ contrast was done and it showed Hiatal hernia. Colonic narrowings raising concern for malignancy, unable to scope pt in the hospital tomorrow due to schedule. Pt has an apt with GI Dr. Quach on 9/27 at 2PM. but pt preferring to follow up with their own GI and PCP, pt has an apt with their PCP at 11AM today, pt cleared for discharge with out pt follow up with GI and PCP     discussed with sister Fang and son Haile in detail about out pt follow up and that Eliquis is currently on hold for possible GI interventions and it needs to be addressed out pt when to start

## 2019-09-26 NOTE — PROGRESS NOTE ADULT - ATTENDING COMMENTS
Agree with above, except for:   patient seen and examined at bedside. Had no new complains.   H/h is stable. Baseline hemoglobin as of 6 month ago 11.   CT abdomen showing colonic narrowing. Concern for colonic mass.   Called Dr. [facility]. He will see patient tomorrow at his office at 2 PM. He will schedule for colonoscopy at his office.   Spoke to patients son Haile who is a doctor in [place]. He is agree with the plan     Vital signs and labs reviewed   Vital Signs Last 24 Hrs  T(C): 36.7 (26 Sep 2019 15:51), Max: 37 (25 Sep 2019 19:23)  T(F): 98 (26 Sep 2019 15:51), Max: 98.6 (25 Sep 2019 19:23)  HR: 65 (26 Sep 2019 15:51) (63 - 69)  BP: 129/90 (26 Sep 2019 15:51) (129/90 - 177/95)  BP(mean): --  RR: 16 (26 Sep 2019 15:51) (16 - 19)  SpO2: 100% (26 Sep 2019 15:51) (100% - 100%)    1. Microcytic anemia/iron deficiency anemia  Colonic mass on CT abdomen. Concern for malignancy   Give another dose of Venofer tomorrow morning and discharge patient before 11 AM     2. History of DVT; hold apixaban.   3. HTN; BP controlled   Plan discussed with patient and with his son in detail.

## 2019-09-26 NOTE — PROGRESS NOTE ADULT - PROBLEM SELECTOR PLAN 1
-sent from PMD for hg of 8.8, reported 1 episode of melena 4 days ago and occasional PETERSON, anemia multifactorial JENNIFER, ? low baseline (unknown) vs malignancy  -On Eliquis for DVT at home   -occult negative  -H/H stable at 8.3/29.9 today   -Resume Eliquis   -f/u ct abd/pelvis w/ contrast  -start Venofer   -GI consult Dr Lynne  -Mon CBC

## 2019-09-26 NOTE — DISCHARGE NOTE PROVIDER - PROVIDER TOKENS
PROVIDER:[TOKEN:[[id number]]] PROVIDER:[TOKEN:[[id number]]],PROVIDER:[TOKEN:[[id number]],FOLLOWUP:[1-3 days]]

## 2019-09-26 NOTE — PROGRESS NOTE ADULT - PROBLEM SELECTOR PLAN 2
-History of unprovoked DVT, on Eliquis  -H/H remains stable with no sign or symptoms of acute bleeding   -resume Eliquis today   -f/u LE doppler to evaluate DVT

## 2019-09-26 NOTE — DISCHARGE NOTE PROVIDER - CARE PROVIDER_API CALL
Bk Quach)  Internal Medicine  [address] [place] [address]  Phone: [phone]  Fax: [phone]  Follow Up Time: Bk Quach)  Internal Medicine  [address] [address] [place] [address]  Phone: [phone]  Fax: [phone]  Follow Up Time:     Jose Rausch)  Internal Medicine  [address] [place] [address]  Phone: [phone]  Fax: [phone]  Follow Up Time: 1-3 days

## 2019-09-26 NOTE — DISCHARGE NOTE PROVIDER - NSDCCPCAREPLAN_GEN_ALL_CORE_FT
PRINCIPAL DISCHARGE DIAGNOSIS  Diagnosis: Anemia, unspecified type  Assessment and Plan of Treatment: you were admitted for low blood count, CT scan of your abdomen was done and it showed narrowing of colon, please follow up with GI Dr. Quach to evaluate need for endoscopy and colonoscopy to rule out cancer.  you blood count is low but it remained stable   -YOUR ELIQUIS IS ON HOLD FOR NOW PENDING POSSIBLE COLONOSCOPY   -FOLLOW UP WITH GI DOCTOR TO SEE WHEN YOU CAN RESUME ELIQUIS      SECONDARY DISCHARGE DIAGNOSES  Diagnosis: DVT, lower extremity  Assessment and Plan of Treatment: -YOUR ELIQUIS IS ON HOLD CURRENTLY FOR POSSIBLE COLONOSCOPY   -ultrasound of your legs was done and it did not show ant blood clots  -follow up with GI doctor to see when you can resume Eliquis   Walking is encouraged, increase activity as tolerated.  If you develop new leg pain, swelling, and/or redness contact your healthcare provider.  If you develop new chest pain with difficulty breathing, a rapid heart rate and/or a feeling of passing out call emergency medical services 911.      Diagnosis: Hypertension  Assessment and Plan of Treatment: Low salt diet  Activity as tolerated.  Take all medication as prescribed.  Follow up with your medical doctor for routine blood pressure monitoring at your next visit.  Notify your doctor if you have any of the following symptoms:   Dizziness, Lightheadedness, Blurry vision, Headache, Chest pain, Shortness of breath PRINCIPAL DISCHARGE DIAGNOSIS  Diagnosis: Anemia, unspecified type  Assessment and Plan of Treatment: you were admitted for low blood count, CT scan of your abdomen was done and it showed narrowing of colon, please follow up with GI Doctor  to evaluate need for endoscopy and colonoscopy to rule out cancer.  you blood count is low but it remained stable   -YOUR ELIQUIS IS ON HOLD FOR NOW PENDING POSSIBLE COLONOSCOPY   -FOLLOW UP WITH GI DOCTOR OR PCP TO SEE WHEN YOU CAN RESUME ELIQUIS      SECONDARY DISCHARGE DIAGNOSES  Diagnosis: DVT, lower extremity  Assessment and Plan of Treatment: -YOUR ELIQUIS IS ON HOLD CURRENTLY FOR POSSIBLE COLONOSCOPY   -ultrasound of your legs was done and it did not show ant blood clots  -follow up with GI doctor to see when you can resume Eliquis   Walking is encouraged, increase activity as tolerated.  If you develop new leg pain, swelling, and/or redness contact your healthcare provider.  If you develop new chest pain with difficulty breathing, a rapid heart rate and/or a feeling of passing out call emergency medical services 911.      Diagnosis: Hypertension  Assessment and Plan of Treatment: Low salt diet  Activity as tolerated.  Take all medication as prescribed.  Follow up with your medical doctor for routine blood pressure monitoring at your next visit.  Notify your doctor if you have any of the following symptoms:   Dizziness, Lightheadedness, Blurry vision, Headache, Chest pain, Shortness of breath

## 2019-09-27 VITALS
OXYGEN SATURATION: 99 % | DIASTOLIC BLOOD PRESSURE: 93 MMHG | SYSTOLIC BLOOD PRESSURE: 140 MMHG | RESPIRATION RATE: 20 BRPM | TEMPERATURE: 98 F | HEART RATE: 68 BPM

## 2019-09-27 LAB
ALBUMIN SERPL ELPH-MCNC: 3.7 G/DL — SIGNIFICANT CHANGE UP (ref 3.5–5)
ALP SERPL-CCNC: 67 U/L — SIGNIFICANT CHANGE UP (ref 40–120)
ALT FLD-CCNC: 22 U/L DA — SIGNIFICANT CHANGE UP (ref 10–60)
ANION GAP SERPL CALC-SCNC: 7 MMOL/L — SIGNIFICANT CHANGE UP (ref 5–17)
AST SERPL-CCNC: 15 U/L — SIGNIFICANT CHANGE UP (ref 10–40)
BILIRUB SERPL-MCNC: 0.4 MG/DL — SIGNIFICANT CHANGE UP (ref 0.2–1.2)
BUN SERPL-MCNC: 16 MG/DL — SIGNIFICANT CHANGE UP (ref 7–18)
CALCIUM SERPL-MCNC: 9.2 MG/DL — SIGNIFICANT CHANGE UP (ref 8.4–10.5)
CHLORIDE SERPL-SCNC: 105 MMOL/L — SIGNIFICANT CHANGE UP (ref 96–108)
CO2 SERPL-SCNC: 28 MMOL/L — SIGNIFICANT CHANGE UP (ref 22–31)
CREAT SERPL-MCNC: 1.23 MG/DL — SIGNIFICANT CHANGE UP (ref 0.5–1.3)
GLUCOSE SERPL-MCNC: 99 MG/DL — SIGNIFICANT CHANGE UP (ref 70–99)
HCT VFR BLD CALC: 31.2 % — LOW (ref 39–50)
HGB BLD-MCNC: 8.6 G/DL — LOW (ref 13–17)
MCHC RBC-ENTMCNC: 17.4 PG — LOW (ref 27–34)
MCHC RBC-ENTMCNC: 27.6 GM/DL — LOW (ref 32–36)
MCV RBC AUTO: 63 FL — LOW (ref 80–100)
NRBC # BLD: 0 /100 WBCS — SIGNIFICANT CHANGE UP (ref 0–0)
PLATELET # BLD AUTO: 317 K/UL — SIGNIFICANT CHANGE UP (ref 150–400)
POTASSIUM SERPL-MCNC: 4.4 MMOL/L — SIGNIFICANT CHANGE UP (ref 3.5–5.3)
POTASSIUM SERPL-SCNC: 4.4 MMOL/L — SIGNIFICANT CHANGE UP (ref 3.5–5.3)
PROT SERPL-MCNC: 7 G/DL — SIGNIFICANT CHANGE UP (ref 6–8.3)
RBC # BLD: 4.95 M/UL — SIGNIFICANT CHANGE UP (ref 4.2–5.8)
RBC # FLD: 21.1 % — HIGH (ref 10.3–14.5)
SODIUM SERPL-SCNC: 140 MMOL/L — SIGNIFICANT CHANGE UP (ref 135–145)
WBC # BLD: 5.05 K/UL — SIGNIFICANT CHANGE UP (ref 3.8–10.5)
WBC # FLD AUTO: 5.05 K/UL — SIGNIFICANT CHANGE UP (ref 3.8–10.5)

## 2019-09-27 PROCEDURE — 99238 HOSP IP/OBS DSCHRG MGMT 30/<: CPT | Mod: GC

## 2019-09-27 RX ADMIN — LOSARTAN POTASSIUM 50 MILLIGRAM(S): 100 TABLET, FILM COATED ORAL at 05:17

## 2019-09-27 RX ADMIN — PANTOPRAZOLE SODIUM 40 MILLIGRAM(S): 20 TABLET, DELAYED RELEASE ORAL at 06:06

## 2019-09-27 NOTE — DISCHARGE NOTE NURSING/CASE MANAGEMENT/SOCIAL WORK - PATIENT PORTAL LINK FT
You can access the FollowMyHealth Patient Portal offered by [facility] by registering at the following website: [email or web address]. By joining [facility]’s FollowMyHealth portal, you will also be able to view your health information using other applications (apps) compatible with our system.

## 2019-10-31 PROCEDURE — 85730 THROMBOPLASTIN TIME PARTIAL: CPT

## 2019-10-31 PROCEDURE — 82746 ASSAY OF FOLIC ACID SERUM: CPT

## 2019-10-31 PROCEDURE — 93005 ELECTROCARDIOGRAM TRACING: CPT

## 2019-10-31 PROCEDURE — 84443 ASSAY THYROID STIM HORMONE: CPT

## 2019-10-31 PROCEDURE — 86901 BLOOD TYPING SEROLOGIC RH(D): CPT

## 2019-10-31 PROCEDURE — 85027 COMPLETE CBC AUTOMATED: CPT

## 2019-10-31 PROCEDURE — 83036 HEMOGLOBIN GLYCOSYLATED A1C: CPT

## 2019-10-31 PROCEDURE — 83540 ASSAY OF IRON: CPT

## 2019-10-31 PROCEDURE — 82607 VITAMIN B-12: CPT

## 2019-10-31 PROCEDURE — 80053 COMPREHEN METABOLIC PANEL: CPT

## 2019-10-31 PROCEDURE — 86850 RBC ANTIBODY SCREEN: CPT

## 2019-10-31 PROCEDURE — 82272 OCCULT BLD FECES 1-3 TESTS: CPT

## 2019-10-31 PROCEDURE — 99285 EMERGENCY DEPT VISIT HI MDM: CPT | Mod: 25

## 2019-10-31 PROCEDURE — 85045 AUTOMATED RETICULOCYTE COUNT: CPT

## 2019-10-31 PROCEDURE — 82728 ASSAY OF FERRITIN: CPT

## 2019-10-31 PROCEDURE — 84155 ASSAY OF PROTEIN SERUM: CPT

## 2019-10-31 PROCEDURE — 82306 VITAMIN D 25 HYDROXY: CPT

## 2019-10-31 PROCEDURE — 83615 LACTATE (LD) (LDH) ENZYME: CPT

## 2019-10-31 PROCEDURE — 86900 BLOOD TYPING SEROLOGIC ABO: CPT

## 2019-10-31 PROCEDURE — 93970 EXTREMITY STUDY: CPT

## 2019-10-31 PROCEDURE — 84466 ASSAY OF TRANSFERRIN: CPT

## 2019-10-31 PROCEDURE — 84165 PROTEIN E-PHORESIS SERUM: CPT

## 2019-10-31 PROCEDURE — 85610 PROTHROMBIN TIME: CPT

## 2019-10-31 PROCEDURE — 83550 IRON BINDING TEST: CPT

## 2019-10-31 PROCEDURE — 80061 LIPID PANEL: CPT

## 2019-10-31 PROCEDURE — 74177 CT ABD & PELVIS W/CONTRAST: CPT

## 2019-10-31 PROCEDURE — 36415 COLL VENOUS BLD VENIPUNCTURE: CPT

## 2019-10-31 PROCEDURE — 86803 HEPATITIS C AB TEST: CPT

## 2019-10-31 PROCEDURE — 83010 ASSAY OF HAPTOGLOBIN QUANT: CPT

## 2019-10-31 PROCEDURE — 80048 BASIC METABOLIC PNL TOTAL CA: CPT

## 2021-09-01 NOTE — ED PROVIDER NOTE - HISTORY ATTESTATION, MLM
I have reviewed and confirmed nurses' notes... Pt complaining of left lower abdominal pain, sharp and constant for 1 hour. Pt denies nausea or vomiting.
